# Patient Record
Sex: FEMALE | Race: BLACK OR AFRICAN AMERICAN | NOT HISPANIC OR LATINO
[De-identification: names, ages, dates, MRNs, and addresses within clinical notes are randomized per-mention and may not be internally consistent; named-entity substitution may affect disease eponyms.]

---

## 2018-03-01 ENCOUNTER — RESULT REVIEW (OUTPATIENT)
Age: 23
End: 2018-03-01

## 2019-08-22 ENCOUNTER — EMERGENCY (EMERGENCY)
Facility: HOSPITAL | Age: 24
LOS: 1 days | Discharge: HOME | End: 2019-08-22
Attending: EMERGENCY MEDICINE
Payer: COMMERCIAL

## 2019-08-22 VITALS
OXYGEN SATURATION: 99 % | DIASTOLIC BLOOD PRESSURE: 78 MMHG | SYSTOLIC BLOOD PRESSURE: 128 MMHG | TEMPERATURE: 98 F | RESPIRATION RATE: 18 BRPM | HEART RATE: 77 BPM

## 2019-08-22 VITALS
OXYGEN SATURATION: 100 % | WEIGHT: 136.03 LBS | RESPIRATION RATE: 18 BRPM | HEART RATE: 75 BPM | DIASTOLIC BLOOD PRESSURE: 81 MMHG | HEIGHT: 71 IN | TEMPERATURE: 98 F | SYSTOLIC BLOOD PRESSURE: 133 MMHG

## 2019-08-22 DIAGNOSIS — Z94.81 BONE MARROW TRANSPLANT STATUS: Chronic | ICD-10-CM

## 2019-08-22 DIAGNOSIS — Z86.2 PERSONAL HISTORY OF DISEASES OF THE BLOOD AND BLOOD-FORMING ORGANS AND CERTAIN DISORDERS INVOLVING THE IMMUNE MECHANISM: Chronic | ICD-10-CM

## 2019-08-22 PROCEDURE — 99283 EMERGENCY DEPT VISIT LOW MDM: CPT | Mod: 25

## 2019-08-22 PROCEDURE — 10160 PNXR ASPIR ABSC HMTMA BULLA: CPT

## 2019-08-22 RX ORDER — ACETAMINOPHEN 500 MG
650 TABLET ORAL ONCE
Refills: 0 | Status: COMPLETED | OUTPATIENT
Start: 2019-08-22 | End: 2019-08-22

## 2019-08-22 RX ORDER — LIDOCAINE AND PRILOCAINE CREAM 25; 25 MG/G; MG/G
1 CREAM TOPICAL ONCE
Refills: 0 | Status: COMPLETED | OUTPATIENT
Start: 2019-08-22 | End: 2019-08-22

## 2019-08-22 RX ORDER — IBUPROFEN 200 MG
400 TABLET ORAL ONCE
Refills: 0 | Status: COMPLETED | OUTPATIENT
Start: 2019-08-22 | End: 2019-08-22

## 2019-08-22 RX ORDER — DIAZEPAM 5 MG
5 TABLET ORAL ONCE
Refills: 0 | Status: DISCONTINUED | OUTPATIENT
Start: 2019-08-22 | End: 2019-08-22

## 2019-08-22 RX ORDER — OXYCODONE HYDROCHLORIDE 5 MG/1
5 TABLET ORAL ONCE
Refills: 0 | Status: DISCONTINUED | OUTPATIENT
Start: 2019-08-22 | End: 2019-08-22

## 2019-08-22 RX ADMIN — Medication 650 MILLIGRAM(S): at 22:02

## 2019-08-22 RX ADMIN — Medication 100 MILLIGRAM(S): at 23:15

## 2019-08-22 RX ADMIN — Medication 5 MILLIGRAM(S): at 22:48

## 2019-08-22 RX ADMIN — LIDOCAINE AND PRILOCAINE CREAM 1 APPLICATION(S): 25; 25 CREAM TOPICAL at 22:03

## 2019-08-22 RX ADMIN — OXYCODONE HYDROCHLORIDE 5 MILLIGRAM(S): 5 TABLET ORAL at 22:48

## 2019-08-22 NOTE — ED PROVIDER NOTE - NSFOLLOWUPINSTRUCTIONS_ED_ALL_ED_FT
Please follow-up with the wound care clinic in one week. You will be contacted by our referral team to schedule an appointment.     Take your doxycycline antibiotic as prescribed.  Read the warnings on the medicine label.    Follow up with your primary care provider as necessary.    Read the attached handout on abscesses and managing them at home.    When should I seek immediate care?     The area around your abscess becomes very painful, warm, or has red streaks.  You have a fever and chills.  Your heart is beating faster than usual.   You feel faint or confused.

## 2019-08-22 NOTE — ED PROVIDER NOTE - OBJECTIVE STATEMENT
24F here for abscess in pubic area. Started feeling sore monday, worsening to today.     hx of sickle cell anemia s/p bone marrow transplant. Not sexually active. LMP beginning last month (induces via hormones).  I & D of breasts several years ago. 23 yo female presents for evaluation of abscess in her pubic region. States that she developed soreness on Monday that worsened over the next couple of days and feels like an abscess. Denies fevers, chills. No vaginal discharge or bleeding. No abdominal pain. Not currently sexually active (for the past year) with no history of STDs. History of I&D for breast abscess several years ago; no other history of abscesses. Of note patient has history of sickle cell disease that was treated with bone marrow transplant at age 8; reports no history of splenic infarction. Patient also has history of ovarian failure and menstruation is induced by OCPs; LMP beginning of July.

## 2019-08-22 NOTE — ED ADULT NURSE NOTE - CAS ELECT INFOMATION PROVIDED
follow up with wound care team, worsening s/s return to ED, pt verbalizes understanding/DC instructions

## 2019-08-22 NOTE — ED PROVIDER NOTE - NS ED ROS FT
Gen: Denies fever, chills  CV: Denies chest pain, palpitations  Skin: Denies rash, erythema, color changes; + pain and abscess in pubic area   Resp: Denies SOB, cough  Endo: Denies sensitivity to heat, cold, increased urination  GI: Denies constipation, nausea, vomiting  Msk: Denies back pain, LE swelling, extremity pain  : Denies dysuria, increased frequency  Neuro: Denies LOC, weakness

## 2019-08-22 NOTE — ED ADULT NURSE NOTE - NSIMPLEMENTINTERV_GEN_ALL_ED
Implemented All Universal Safety Interventions:  [place] to call system. Call bell, personal items and telephone within reach. Instruct patient to call for assistance. Room bathroom lighting operational. Non-slip footwear when patient is off stretcher. Physically safe environment: no spills, clutter or unnecessary equipment. Stretcher in lowest position, wheels locked, appropriate side rails in place.

## 2019-08-22 NOTE — ED PROVIDER NOTE - CLINICAL SUMMARY MEDICAL DECISION MAKING FREE TEXT BOX
25 yo female presents for evaluation of abscess in pubic region. Non-toxic appearing, afebrile with stable vital signs. Exam reveals fluctuant abscess at pubis, supravaginal. No overlying erythema or surrounding cellulitis. Not concerned about sepsis or systemic infection given clinical picture. Will I and D abscess and prescribe antibx, likely keflex/diclox as patient does not require MRSA coverage given that she has not failed antibiotic therapy, is SIRS negative and does not have impaired immune system. see MD note

## 2019-08-22 NOTE — ED PROVIDER NOTE - PHYSICAL EXAMINATION
Gen: no acute distress   HEENT: head is normocephalic, atraumatic; sclera anicteric, non-injected; trachea midline   CV: RRR, +S1/S2, no M/R/G  Resp: CTAB, no W/R/R  GI: Abdomen soft non-distended, NTTP, no masses  : fluctuant 3 cm x 3cm abscess at pubic, supravaginal region; tender to touch/palpation; no overlying erythema or color changes; no spontaneous drainage   MSK: No open wounds, no bruising, no LE edema  Neuro: A&Ox3, following commands, moving all four extremities spontaneously  Psych: appropriate mood and affect Gen: no acute distress   HEENT: head is normocephalic, atraumatic; sclera anicteric, non-injected; trachea midline   CV: RRR, +S1/S2, no M/R/G  Resp: CTAB, no W/R/R  GI: Abdomen soft non-distended, NTTP, no masses  Skin: fluctuant 3 cm x 3cm abscess at pubic, supravaginal region; tender to touch/palpation; no overlying erythema or color changes; no spontaneous drainage   MSK: No open wounds, no bruising, no LE edema  Neuro: A&Ox3, following commands, moving all four extremities spontaneously  Psych: appropriate mood and affect

## 2019-08-22 NOTE — ED ADULT NURSE NOTE - OBJECTIVE STATEMENT
pt has a history of sickle cell anemia that she had a bone marrow transplant for.  she is here today for an abscess on her labia since monday.  she has been placing warm compresses and she took motrin at 1900

## 2019-08-22 NOTE — ED PROVIDER NOTE - ATTENDING CONTRIBUTION TO CARE
------------ATTENDING NOTE------------   healthy pt w/ mother c/o several days of increasing swelling and constant moderate tenderness to suprapubic area from ingrown hair after shaving, exam c/w abscess, no fevers, no systemic symptoms, no urinary or GI complaints, (-)UPT poc, I&D, adding antibiotic, made rapid Wound Care fu, nml VS at HI, soft benign abd, in depth dw all about ddx, tx, oro, continued close outpt fu.  - Oscar Mcpherson MD   -----------------------------------------------

## 2019-08-29 ENCOUNTER — APPOINTMENT (OUTPATIENT)
Dept: WOUND CARE | Facility: CLINIC | Age: 24
End: 2019-08-29

## 2019-12-07 ENCOUNTER — EMERGENCY (EMERGENCY)
Facility: HOSPITAL | Age: 24
LOS: 1 days | Discharge: HOME | End: 2019-12-07
Admitting: EMERGENCY MEDICINE
Payer: COMMERCIAL

## 2019-12-07 VITALS
DIASTOLIC BLOOD PRESSURE: 90 MMHG | RESPIRATION RATE: 18 BRPM | SYSTOLIC BLOOD PRESSURE: 136 MMHG | TEMPERATURE: 98 F | HEART RATE: 88 BPM

## 2019-12-07 VITALS
RESPIRATION RATE: 16 BRPM | OXYGEN SATURATION: 100 % | TEMPERATURE: 99 F | HEART RATE: 71 BPM | SYSTOLIC BLOOD PRESSURE: 126 MMHG | DIASTOLIC BLOOD PRESSURE: 76 MMHG

## 2019-12-07 DIAGNOSIS — Z86.2 PERSONAL HISTORY OF DISEASES OF THE BLOOD AND BLOOD-FORMING ORGANS AND CERTAIN DISORDERS INVOLVING THE IMMUNE MECHANISM: Chronic | ICD-10-CM

## 2019-12-07 DIAGNOSIS — Z94.81 BONE MARROW TRANSPLANT STATUS: Chronic | ICD-10-CM

## 2019-12-07 DIAGNOSIS — N76.4 ABSCESS OF VULVA: ICD-10-CM

## 2019-12-07 LAB
ALBUMIN SERPL ELPH-MCNC: 4.6 G/DL — SIGNIFICANT CHANGE UP (ref 3.3–5)
ALP SERPL-CCNC: 126 U/L — HIGH (ref 40–120)
ALT FLD-CCNC: 15 U/L — SIGNIFICANT CHANGE UP (ref 4–33)
ANION GAP SERPL CALC-SCNC: 15 MMO/L — HIGH (ref 7–14)
AST SERPL-CCNC: 32 U/L — SIGNIFICANT CHANGE UP (ref 4–32)
BASOPHILS # BLD AUTO: 0.02 K/UL — SIGNIFICANT CHANGE UP (ref 0–0.2)
BASOPHILS NFR BLD AUTO: 0.3 % — SIGNIFICANT CHANGE UP (ref 0–2)
BILIRUB SERPL-MCNC: 0.3 MG/DL — SIGNIFICANT CHANGE UP (ref 0.2–1.2)
BUN SERPL-MCNC: 9 MG/DL — SIGNIFICANT CHANGE UP (ref 7–23)
CALCIUM SERPL-MCNC: 9.8 MG/DL — SIGNIFICANT CHANGE UP (ref 8.4–10.5)
CHLORIDE SERPL-SCNC: 100 MMOL/L — SIGNIFICANT CHANGE UP (ref 98–107)
CO2 SERPL-SCNC: 23 MMOL/L — SIGNIFICANT CHANGE UP (ref 22–31)
CREAT SERPL-MCNC: 0.77 MG/DL — SIGNIFICANT CHANGE UP (ref 0.5–1.3)
EOSINOPHIL # BLD AUTO: 0.04 K/UL — SIGNIFICANT CHANGE UP (ref 0–0.5)
EOSINOPHIL NFR BLD AUTO: 0.5 % — SIGNIFICANT CHANGE UP (ref 0–6)
GLUCOSE SERPL-MCNC: 83 MG/DL — SIGNIFICANT CHANGE UP (ref 70–99)
GRAM STN WND: SIGNIFICANT CHANGE UP
HCT VFR BLD CALC: 37.9 % — SIGNIFICANT CHANGE UP (ref 34.5–45)
HGB BLD-MCNC: 13 G/DL — SIGNIFICANT CHANGE UP (ref 11.5–15.5)
IMM GRANULOCYTES NFR BLD AUTO: 0.1 % — SIGNIFICANT CHANGE UP (ref 0–1.5)
LYMPHOCYTES # BLD AUTO: 1.3 K/UL — SIGNIFICANT CHANGE UP (ref 1–3.3)
LYMPHOCYTES # BLD AUTO: 16.7 % — SIGNIFICANT CHANGE UP (ref 13–44)
MCHC RBC-ENTMCNC: 30.6 PG — SIGNIFICANT CHANGE UP (ref 27–34)
MCHC RBC-ENTMCNC: 34.3 % — SIGNIFICANT CHANGE UP (ref 32–36)
MCV RBC AUTO: 89.2 FL — SIGNIFICANT CHANGE UP (ref 80–100)
MONOCYTES # BLD AUTO: 0.55 K/UL — SIGNIFICANT CHANGE UP (ref 0–0.9)
MONOCYTES NFR BLD AUTO: 7.1 % — SIGNIFICANT CHANGE UP (ref 2–14)
NEUTROPHILS # BLD AUTO: 5.88 K/UL — SIGNIFICANT CHANGE UP (ref 1.8–7.4)
NEUTROPHILS NFR BLD AUTO: 75.3 % — SIGNIFICANT CHANGE UP (ref 43–77)
NRBC # FLD: 0 K/UL — SIGNIFICANT CHANGE UP (ref 0–0)
PLATELET # BLD AUTO: 180 K/UL — SIGNIFICANT CHANGE UP (ref 150–400)
PMV BLD: 11.4 FL — SIGNIFICANT CHANGE UP (ref 7–13)
POTASSIUM SERPL-MCNC: 5 MMOL/L — SIGNIFICANT CHANGE UP (ref 3.5–5.3)
POTASSIUM SERPL-SCNC: 5 MMOL/L — SIGNIFICANT CHANGE UP (ref 3.5–5.3)
PROT SERPL-MCNC: 7.8 G/DL — SIGNIFICANT CHANGE UP (ref 6–8.3)
RBC # BLD: 4.25 M/UL — SIGNIFICANT CHANGE UP (ref 3.8–5.2)
RBC # FLD: 12.8 % — SIGNIFICANT CHANGE UP (ref 10.3–14.5)
SODIUM SERPL-SCNC: 138 MMOL/L — SIGNIFICANT CHANGE UP (ref 135–145)
SPECIMEN SOURCE: SIGNIFICANT CHANGE UP
WBC # BLD: 7.8 K/UL — SIGNIFICANT CHANGE UP (ref 3.8–10.5)
WBC # FLD AUTO: 7.8 K/UL — SIGNIFICANT CHANGE UP (ref 3.8–10.5)

## 2019-12-07 PROCEDURE — 99285 EMERGENCY DEPT VISIT HI MDM: CPT

## 2019-12-07 RX ORDER — MORPHINE SULFATE 50 MG/1
2 CAPSULE, EXTENDED RELEASE ORAL ONCE
Refills: 0 | Status: DISCONTINUED | OUTPATIENT
Start: 2019-12-07 | End: 2019-12-07

## 2019-12-07 RX ORDER — LIDOCAINE 4 G/100G
1 CREAM TOPICAL ONCE
Refills: 0 | Status: COMPLETED | OUTPATIENT
Start: 2019-12-07 | End: 2019-12-07

## 2019-12-07 RX ORDER — IBUPROFEN 200 MG
600 TABLET ORAL ONCE
Refills: 0 | Status: COMPLETED | OUTPATIENT
Start: 2019-12-07 | End: 2019-12-07

## 2019-12-07 RX ORDER — ACETAMINOPHEN 500 MG
650 TABLET ORAL ONCE
Refills: 0 | Status: COMPLETED | OUTPATIENT
Start: 2019-12-07 | End: 2019-12-07

## 2019-12-07 RX ADMIN — Medication 300 MILLIGRAM(S): at 15:50

## 2019-12-07 RX ADMIN — MORPHINE SULFATE 2 MILLIGRAM(S): 50 CAPSULE, EXTENDED RELEASE ORAL at 15:27

## 2019-12-07 RX ADMIN — MORPHINE SULFATE 2 MILLIGRAM(S): 50 CAPSULE, EXTENDED RELEASE ORAL at 15:46

## 2019-12-07 RX ADMIN — Medication 650 MILLIGRAM(S): at 13:37

## 2019-12-07 RX ADMIN — Medication 600 MILLIGRAM(S): at 13:17

## 2019-12-07 RX ADMIN — MORPHINE SULFATE 2 MILLIGRAM(S): 50 CAPSULE, EXTENDED RELEASE ORAL at 14:58

## 2019-12-07 RX ADMIN — Medication 600 MILLIGRAM(S): at 13:37

## 2019-12-07 RX ADMIN — MORPHINE SULFATE 2 MILLIGRAM(S): 50 CAPSULE, EXTENDED RELEASE ORAL at 15:16

## 2019-12-07 RX ADMIN — LIDOCAINE 1 APPLICATION(S): 4 CREAM TOPICAL at 14:53

## 2019-12-07 RX ADMIN — Medication 650 MILLIGRAM(S): at 13:17

## 2019-12-07 NOTE — ED PROVIDER NOTE - NSFOLLOWUPINSTRUCTIONS_ED_ALL_ED_FT
Follow up with your OBGYN, you have an appointment scheduled for 12/17  Follow up with your primary care provider within 1 week  Take Clindamycin 300mg (1 tablet) three times a day for 7 days  Take Motrin 600mg every 6 hours as needed for pain with food (you can also take Tylenol 650mg every 6 hours as needed for pain)  Sitz baths daily (sit in 2-3 inches of warm water for 15-20 minutes)  Return to the ER with any worsening or concerning symptoms, increased pain or swelling, fever/chills, nausea, vomiting or any other concerns. Follow up with your OBGYN, you have an appointment scheduled for 12/10  Follow up with your primary care provider within 1 week  Take Clindamycin 300mg (1 tablet) three times a day for 7 days  Take Motrin 600mg every 6 hours as needed for pain with food (you can also take Tylenol 650mg every 6 hours as needed for pain)  Sitz baths daily (sit in 2-3 inches of warm water for 15-20 minutes)  Return to the ER with any worsening or concerning symptoms, increased pain or swelling, fever/chills, nausea, vomiting or any other concerns.

## 2019-12-07 NOTE — CONSULT NOTE ADULT - SUBJECTIVE AND OBJECTIVE BOX
HPI    Patient is a 24y G0 who presents with L labial swelling and pain x4days. Per patient she noticed a small bump on Wednesday on her left labia, which proceeded to increase in size and discomfort. Tried Motrin at home for pain, which helped. Additionally tried neosporin topically, which did not help. Patient reports presenting to her PCP on Thursday for evaluation. States bloodwork and urine was taken, but patient sent home without any further care instructions. Denies any associated fevers, chills, N/V, vaginal discharge or bleeding. Patient reports 2 prior episodes of mons pubis abscesses this year, in October and August, s/p abx treatement. States this is the first labial abscess.    OB/GYN Provider: Dr. Galo    OBHx: Denies  GYNHx:Denies  PAST MEDICAL & SURGICAL HISTORY: Sickle cell anemia, H/O bone marrow transplant (age 8)  Rx: None  All: NKDA  Psych Hx: Denies  Social Hx: Occasional etOH. Denies tobacco and illicit drug use   ROS Negative unless otherwise noted in HPI    Vital Signs Last 24 Hrs  T(C): 36.7 (07 Dec 2019 10:32), Max: 36.7 (07 Dec 2019 10:32)  T(F): 98 (07 Dec 2019 10:32), Max: 98 (07 Dec 2019 10:32)  HR: 88 (07 Dec 2019 10:32) (88 - 88)  BP: 136/90 (07 Dec 2019 10:32) (136/90 - 136/90)  BP(mean): --  RR: 18 (07 Dec 2019 10:32) (18 - 18)  SpO2: --    PHYSICAL EXAM:  Constitutional: alert and oriented x 3  Respiratory: clear  Cardiovascular: regular rate and rhythm  Gastrointestinal: soft, non tender  Genitourinary: Firm L labial minora edema and erythema, TTP, no drainage noted

## 2019-12-07 NOTE — ED PROVIDER NOTE - CLINICAL SUMMARY MEDICAL DECISION MAKING FREE TEXT BOX
24yF w/pmhx sickle cell disease s/p bone marrow transplant p/w left sided vaginal pain and swelling x 1 week. On exam pt with left sided labial abscess. Will check ucg, provide analgesia and consult GYN

## 2019-12-07 NOTE — ED PROVIDER NOTE - PATIENT PORTAL LINK FT
You can access the FollowMyHealth Patient Portal offered by [facility] by registering at the following website: [email or web address]. By joining [employer]’s FollowMyHealth portal, you will also be able to view your health information using other applications (apps) compatible with our system.

## 2019-12-07 NOTE — PROCEDURE NOTE - ADDITIONAL PROCEDURE DETAILS
No complications  The patient was discharged in satisfactory condition on oral antibiotics (Clindamycin) for 1wk and  with instructions to do sitz baths TID   F/U with Primary GYN Dr Galo in 72hrs for postop check

## 2019-12-07 NOTE — CONSULT NOTE ADULT - ASSESSMENT
Patient is a 24y G0 who presents with L labial swelling and pain x4days. Physical exam significant for L labial abscess. Drainage performed bedside. Patient is a 24y G0 who presents with L labial swelling and pain x4days. Physical exam significant for L labial abscess. Partial drainage performed bedside, unable to complete 2/2 patient discomfort. See procedure note for further details.

## 2019-12-07 NOTE — ED PROVIDER NOTE - PROGRESS NOTE DETAILS
JAIME Silva: Spoke with OBGYN who will see pt in the ED AJIME Silva: Pt seen by GYN, had I&D perfomed, recommend d/c home with clinda, sitz baths, and OTC pain medications JAIME Silva: Pt seen by GYN, had I&D perfomed, recommend d/c home with clinda, sitz baths, and OTC pain medications. Pt has a follow up appointment scheduled with her OBGYN on tuesday 12/10

## 2019-12-07 NOTE — CONSULT NOTE ADULT - PROBLEM SELECTOR RECOMMENDATION 9
To follow up w/ Dr. Galo in office Tuesday (12/17/2019)  Sitz baths TID  Clindamycin x7days  Motrin and Tylenol PRN  Will follow up wound culture    Yesenia Mason, PGY2  Discussed and evaluated with Dr. Zelaay bedside

## 2019-12-07 NOTE — ED PROVIDER NOTE - OBJECTIVE STATEMENT
24yF w/pmhx sickle cell anemia s/p bone marrow transplant p/w vaginal pain and swelling x 1 week. Pt states one week ago she noticed a small tender bump to the left labia which has since increased in size and has become more painful. Pt denies fever/chills, abdominal pain, vaginal discharge, nausea, vomiting, diarrhea, dysuria, urinary frequency, headache, dizziness, weakness, recent travel or illness or any other concerns.  pts OBGYN is Dr.Lana Galo

## 2019-12-07 NOTE — ED PROVIDER NOTE - PHYSICAL EXAMINATION
: chaperone JAIME Ortega, +left sided vaginal swelling and tenderness, no spontaneous drainage, +fluctuance

## 2019-12-09 LAB — SPECIMEN SOURCE: SIGNIFICANT CHANGE UP

## 2019-12-10 LAB
-  CEFAZOLIN: SIGNIFICANT CHANGE UP
-  CIPROFLOXACIN: SIGNIFICANT CHANGE UP
-  CLINDAMYCIN: SIGNIFICANT CHANGE UP
-  ERYTHROMYCIN: SIGNIFICANT CHANGE UP
-  GENTAMICIN: SIGNIFICANT CHANGE UP
-  LEVOFLOXACIN: SIGNIFICANT CHANGE UP
-  MOXIFLOXACIN(AEROBIC): SIGNIFICANT CHANGE UP
-  OXACILLIN: SIGNIFICANT CHANGE UP
-  RIFAMPIN.: SIGNIFICANT CHANGE UP
-  TETRACYCLINE: SIGNIFICANT CHANGE UP
-  TRIMETHOPRIM/SULFAMETHOXAZOLE: SIGNIFICANT CHANGE UP
-  VANCOMYCIN: SIGNIFICANT CHANGE UP
BACTERIA WND CULT: SIGNIFICANT CHANGE UP
METHOD TYPE: SIGNIFICANT CHANGE UP
ORGANISM # SPEC MICROSCOPIC CNT: SIGNIFICANT CHANGE UP
ORGANISM # SPEC MICROSCOPIC CNT: SIGNIFICANT CHANGE UP

## 2021-02-22 ENCOUNTER — RESULT REVIEW (OUTPATIENT)
Age: 26
End: 2021-02-22

## 2021-03-29 ENCOUNTER — APPOINTMENT (OUTPATIENT)
Dept: HUMAN REPRODUCTION | Facility: CLINIC | Age: 26
End: 2021-03-29
Payer: COMMERCIAL

## 2021-03-29 PROCEDURE — 99204 OFFICE O/P NEW MOD 45 MIN: CPT

## 2021-03-29 PROCEDURE — 99072 ADDL SUPL MATRL&STAF TM PHE: CPT

## 2022-01-13 NOTE — ED PROCEDURE NOTE - PROCEDURE DATE TIME, MLM
22-Aug-2019 22:50 Hemigard Postcare Instructions: The HEMIGARD strips are to remain completely dry for at least 5-7 days.
